# Patient Record
Sex: FEMALE | Race: WHITE | NOT HISPANIC OR LATINO | URBAN - METROPOLITAN AREA
[De-identification: names, ages, dates, MRNs, and addresses within clinical notes are randomized per-mention and may not be internally consistent; named-entity substitution may affect disease eponyms.]

---
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
## 2018-05-09 NOTE — PATIENT DISCUSSION
(H37.711) Puckering of macula, bilateral - Assesment : Examination revealed ERM OU. OCT today shows some DME OS - Plan : Monitor. Advised patient to call our office with decreased vision or increased symptoms. Keep scheduled appoint  with  Dr Virginia Esparza  in August.

## 2018-05-09 NOTE — PATIENT DISCUSSION
(S60.5974) Type 2 diab with mild nonp rtnop with macular edema l eye - Assesment : Examination reveals Type II diabetes with mild non proliferative changes. HX of Avastin injections and PRP lasers OU. MAC OCT OS - DME evident today.  - Plan : Continue regular follow ups and treatment with retina specialist.

## 2019-05-03 NOTE — PATIENT DISCUSSION
(D72.0047) Type 2 diab with mild nonp rtnop without mclr edema r eye - Assesment : Examination reveals Type II diabetes with mild non proliferative changes.   HX of Avastin injections and PRP lasers OU. - Plan : Monitor for changes Continue regular follow ups with retina specialist.

## 2019-05-03 NOTE — PATIENT DISCUSSION
(A28.8241) Type 2 diab with mild nonp rtnop with macular edema l eye - Assesment : Examination reveals Type II diabetes with mild non proliferative changes. HX of Avastin injections and PRP lasers OU.   MAC OCT OS - DME evident today with reduced edema. - Plan : Continue regular follow ups and treatment with retina specialist.

## 2019-05-03 NOTE — PATIENT DISCUSSION
(H35.373) Puckering of macula, bilateral - Assesment : Examination revealed ERM OU. - Plan : Monitor. Advised patient to call our office with decreased vision or increased symptoms.  Keep scheduled appoint  with  Dr Tonie Moe

## 2019-05-03 NOTE — PATIENT DISCUSSION
(H35.341) Macular cyst, hole, or pseudohole, right eye - Assesment : Examination revealed a lamellar macular hole OD - stable on OCT today.  - Plan : Recommend to monitor for changes and continue regular follow ups with retina specialist.  1 year Exam-[facility] [place]

## 2020-04-18 ASSESSMENT — TONOMETRY
OS_IOP_MMHG: 17.0
OD_IOP_MMHG: 22.0

## 2020-04-18 ASSESSMENT — KERATOMETRY
OD_K2POWER_DIOPTERS: 43.75
OS_AXISANGLE_DEGREES: 14
OD_K1POWER_DIOPTERS: 44
OS_K2POWER_DIOPTERS: 44
OS_AXISANGLE2_DEGREES: 104
OD_AXISANGLE_DEGREES: 140
OS_K1POWER_DIOPTERS: 44.25
OD_AXISANGLE2_DEGREES: 50

## 2020-04-18 ASSESSMENT — VISUAL ACUITY
OS_SC: J1+
OS_SC: 20/15
OD_SC: J1+
OD_SC: 20/15

## 2021-02-25 ENCOUNTER — APPOINTMENT (RX ONLY)
Dept: URBAN - METROPOLITAN AREA CLINIC 126 | Facility: CLINIC | Age: 21
Setting detail: DERMATOLOGY
End: 2021-02-25

## 2021-02-25 DIAGNOSIS — D18.0 HEMANGIOMA: ICD-10-CM

## 2021-02-25 DIAGNOSIS — L57.8 OTHER SKIN CHANGES DUE TO CHRONIC EXPOSURE TO NONIONIZING RADIATION: ICD-10-CM | Status: WORSENING

## 2021-02-25 DIAGNOSIS — L90.5 SCAR CONDITIONS AND FIBROSIS OF SKIN: ICD-10-CM

## 2021-02-25 DIAGNOSIS — Z71.89 OTHER SPECIFIED COUNSELING: ICD-10-CM

## 2021-02-25 DIAGNOSIS — I78.8 OTHER DISEASES OF CAPILLARIES: ICD-10-CM

## 2021-02-25 DIAGNOSIS — D22 MELANOCYTIC NEVI: ICD-10-CM

## 2021-02-25 PROBLEM — D18.01 HEMANGIOMA OF SKIN AND SUBCUTANEOUS TISSUE: Status: ACTIVE | Noted: 2021-02-25

## 2021-02-25 PROBLEM — D22.5 MELANOCYTIC NEVI OF TRUNK: Status: ACTIVE | Noted: 2021-02-25

## 2021-02-25 PROCEDURE — 99204 OFFICE O/P NEW MOD 45 MIN: CPT

## 2021-02-25 PROCEDURE — ? PRESCRIPTION MEDICATION MANAGEMENT

## 2021-02-25 PROCEDURE — ? RECOMMENDATIONS

## 2021-02-25 PROCEDURE — ? COUNSELING

## 2021-02-25 ASSESSMENT — LOCATION DETAILED DESCRIPTION DERM
LOCATION DETAILED: RIGHT PROXIMAL PRETIBIAL REGION
LOCATION DETAILED: LEFT MID-UPPER BACK
LOCATION DETAILED: EPIGASTRIC SKIN
LOCATION DETAILED: LEFT CLAVICULAR SKIN
LOCATION DETAILED: INFERIOR MID FOREHEAD
LOCATION DETAILED: LEFT CENTRAL MALAR CHEEK
LOCATION DETAILED: RIGHT INFERIOR CENTRAL MALAR CHEEK
LOCATION DETAILED: LEFT SUPERIOR MEDIAL UPPER BACK

## 2021-02-25 ASSESSMENT — LOCATION ZONE DERM
LOCATION ZONE: LEG
LOCATION ZONE: FACE
LOCATION ZONE: TRUNK

## 2021-02-25 ASSESSMENT — LOCATION SIMPLE DESCRIPTION DERM
LOCATION SIMPLE: INFERIOR FOREHEAD
LOCATION SIMPLE: LEFT CLAVICULAR SKIN
LOCATION SIMPLE: LEFT CHEEK
LOCATION SIMPLE: LEFT UPPER BACK
LOCATION SIMPLE: RIGHT PRETIBIAL REGION
LOCATION SIMPLE: ABDOMEN
LOCATION SIMPLE: RIGHT CHEEK

## 2021-02-25 NOTE — PROCEDURE: RECOMMENDATIONS
Recommendation Preamble: The following recommendations were made during the visit:
Detail Level: Zone
Recommendation Override: Facials hydra facial
Render Risk Assessment In Note?: no

## 2021-02-25 NOTE — PROCEDURE: PRESCRIPTION MEDICATION MANAGEMENT
Detail Level: Zone
Render In Strict Bullet Format?: No
Initiate Treatment: CE ferculic acid
Continue Regimen: Morning \\nNeutrogena moisturizer HA\\nNiacin \\nRCD ult pref SPF (gave samples, if pt likes it will rtc to purchase)\\n\\nEvening\\nCerave PM\\nLactic acid 1x a week\\nTretinoin 1 x a week\\nCE Ferculic acid
Modify Regimen: Increase tretinoin to more than 1x a week and will call if needs a refill

## 2022-05-23 NOTE — PATIENT DISCUSSION
(A04.4352) Type 2 diab with mild nonp rtnop without mclr edema r eye - Assesment : Examination reveals Type II diabetes with mild non proliferative changes. HX of Avastin injections and PRP lasers OU.   . - Plan : Continue regular follow ups with retina specialist. 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  He reports intermittent discomfort with one episode of constipation that had resolved. He had recently had to manually reduce the left hernia. As well he had noted to have another small bulged on the right which was reduced by spontaneously laying in the Recumbant position . Today he reports only minimal discomfort.    He present to PST in anticipation of having Bilateral inguinal hernia repair with mesh.  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  He reports intermittent discomfort with one episode of constipation that had resolved. He had recently had to manually reduce the left hernia. Pt followed up with DR. Gar and upon exam right inguinal hernia noted, pt denies any bulge or pain to right side  . Today he reports only minimal discomfort.    He present to PST in anticipation of having Bilateral inguinal hernia repair with mesh.

## 2022-06-04 ENCOUNTER — TELEPHONE ENCOUNTER (OUTPATIENT)
Dept: URBAN - METROPOLITAN AREA CLINIC 68 | Facility: CLINIC | Age: 22
End: 2022-06-04

## 2022-06-05 ENCOUNTER — TELEPHONE ENCOUNTER (OUTPATIENT)
Dept: URBAN - METROPOLITAN AREA CLINIC 68 | Facility: CLINIC | Age: 22
End: 2022-06-05

## 2022-06-05 RX ORDER — ROSUVASTATIN CALCIUM 10 MG/1
ROSUVASTATIN CALCIUM( 10MG ORAL 1 DAILY ) ACTIVE -HX ENTRY TABLET, FILM COATED ORAL DAILY
Status: ACTIVE | COMMUNITY
Start: 2019-01-11

## 2022-06-05 RX ORDER — NORETHINDRONE ACETATE AND ETHINYL ESTRADIOL, AND FERROUS FUMARATE 1MG-20(24)
TAYTULLA( 1-20MG-MCG(24) ORAL 1 DAILY ) ACTIVE -HX ENTRY KIT ORAL DAILY
Status: ACTIVE | COMMUNITY
Start: 2019-01-11

## 2022-06-25 ENCOUNTER — TELEPHONE ENCOUNTER (OUTPATIENT)
Age: 22
End: 2022-06-25

## 2022-06-25 RX ORDER — DICYCLOMINE HYDROCHLORIDE 20 MG/2ML
INJECTION, SOLUTION INTRAMUSCULAR
Qty: 120 | Refills: 0 | OUTPATIENT
Start: 2019-01-11 | End: 2019-03-12

## 2022-06-26 ENCOUNTER — TELEPHONE ENCOUNTER (OUTPATIENT)
Age: 22
End: 2022-06-26

## 2022-06-26 RX ORDER — NORETHINDRONE ACETATE AND ETHINYL ESTRADIOL, AND FERROUS FUMARATE 1MG-20(24)
TAYTULLA( 1-20MG-MCG(24) ORAL 1 DAILY ) ACTIVE -HX ENTRY KIT ORAL DAILY
Status: ACTIVE | COMMUNITY
Start: 2019-01-11

## 2022-06-26 RX ORDER — ROSUVASTATIN CALCIUM 10 MG/1
ROSUVASTATIN CALCIUM( 10MG ORAL 1 DAILY ) ACTIVE -HX ENTRY TABLET, FILM COATED ORAL DAILY
Status: ACTIVE | COMMUNITY
Start: 2019-01-11

## 2023-08-28 PROBLEM — Z00.00 ENCOUNTER FOR PREVENTIVE HEALTH EXAMINATION: Status: ACTIVE | Noted: 2023-08-28

## 2023-08-30 ENCOUNTER — APPOINTMENT (OUTPATIENT)
Dept: HEART AND VASCULAR | Facility: CLINIC | Age: 23
End: 2023-08-30

## 2023-08-31 ENCOUNTER — NON-APPOINTMENT (OUTPATIENT)
Age: 23
End: 2023-08-31

## 2023-09-01 ENCOUNTER — NON-APPOINTMENT (OUTPATIENT)
Age: 23
End: 2023-09-01

## 2023-09-01 ENCOUNTER — APPOINTMENT (OUTPATIENT)
Dept: HEART AND VASCULAR | Facility: CLINIC | Age: 23
End: 2023-09-01
Payer: COMMERCIAL

## 2023-09-01 VITALS
HEART RATE: 101 BPM | BODY MASS INDEX: 21.97 KG/M2 | OXYGEN SATURATION: 98 % | HEIGHT: 63 IN | DIASTOLIC BLOOD PRESSURE: 63 MMHG | WEIGHT: 124 LBS | TEMPERATURE: 98.1 F | SYSTOLIC BLOOD PRESSURE: 92 MMHG

## 2023-09-01 DIAGNOSIS — Z80.6 FAMILY HISTORY OF LEUKEMIA: ICD-10-CM

## 2023-09-01 DIAGNOSIS — Z78.9 OTHER SPECIFIED HEALTH STATUS: ICD-10-CM

## 2023-09-01 DIAGNOSIS — Z80.0 FAMILY HISTORY OF MALIGNANT NEOPLASM OF DIGESTIVE ORGANS: ICD-10-CM

## 2023-09-01 PROCEDURE — 93000 ELECTROCARDIOGRAM COMPLETE: CPT

## 2023-09-01 PROCEDURE — 99204 OFFICE O/P NEW MOD 45 MIN: CPT | Mod: 25

## 2023-09-01 RX ORDER — CALCIUM CARBONATE/VITAMIN D3 600 MG-10
TABLET ORAL
Refills: 0 | Status: DISCONTINUED | COMMUNITY

## 2023-09-01 NOTE — PHYSICAL EXAM
[Well Developed] : well developed [Well Nourished] : well nourished [No Acute Distress] : no acute distress [Normal Conjunctiva] : normal conjunctiva [Normal Venous Pressure] : normal venous pressure [Normal S1, S2] : normal S1, S2 [Clear Lung Fields] : clear lung fields [No Respiratory Distress] : no respiratory distress

## 2023-09-02 NOTE — HISTORY OF PRESENT ILLNESS
[FreeTextEntry1] : Marlon is 24 yo F w/ hx Hypercholesterolemia who presents for HLD consultation. She has seen multiple providers in the past. She brought recent results which will be scanned in. Her mother also has hypercholesterolemia. Her family is from [place]. Hypercholesterolemia was first noticed in age 14. She has been off and on statin therapy since that time. She stopped statin therapy in 2021, after developing long covid and reactivation of Thang barr.    Labs:  She had blood test recently. Last Thursday Tchol 270, LDL 180s, Dp142w, HDL 69.   Family history: Mother and Grandmother. No family hx of ascvd. Leukemia dad's mom. Aunt and grandfather mother side had liver cancer.  LIFESTYLE HISTORY:  She engages in moderate physical activity.  Diet: plant protein mostly, olive oil and  Meds: birth control, anxiety, omega-3 FA . She denied plans of becoming pregnant.   =============================== RADIOLOGY/IMAGING/DIAGNOSTIC TESTING:  -EKG (09/01/2023):  Sinus Rhythm, HR 74  wnl.   LABS:  -A1c (date):  -lipoprotein A (date):  -TSH (date):

## 2023-09-02 NOTE — ASSESSMENT
[FreeTextEntry1] : Marlon is 24 yo F w/ hx Hypercholesterolemia who presents for HLD consultation.  #HLD - patient already on low saturated fat diet and moderate physical activity. Likely a genetic component of HLD given family history (FH vs. polygenic). Given she has about 9 year since diagnosis of elevated lipid encouraged statin therapy to decrease overall risk of ASCVD across lifetime. Benefits and risk discussed. Patient aware to stop medication at least 3 months prior to conception. Will monitor liver enzymes closely given family history of liver disease however this was in the setting of alcohol use. Sent rosuvastatin 10mg daily to her pharmacy. Labs in 3 months. Telehealth follow appointment afterwards at which point crestor will likely be increased. Checking lipoprotien a to check for additional risk. Consider nutritionist simona henry or genetic testing depending on results, response to therapy or development of features of familial hypercholesterolemia.    WESLEY fuentes participated as a fellow in training. Case discussed with Dr. Zeng.

## 2023-12-28 ENCOUNTER — APPOINTMENT (OUTPATIENT)
Dept: HEART AND VASCULAR | Facility: CLINIC | Age: 23
End: 2023-12-28
Payer: COMMERCIAL

## 2023-12-28 PROCEDURE — 99215 OFFICE O/P EST HI 40 MIN: CPT | Mod: 95

## 2023-12-28 RX ORDER — LORAZEPAM 0.5 MG/1
0.5 TABLET ORAL
Refills: 0 | Status: ACTIVE | COMMUNITY

## 2023-12-28 NOTE — HISTORY OF PRESENT ILLNESS
[Home] : at home, [unfilled] , at the time of the visit. [Other Location: e.g. Home (Enter Location, City,State)___] : at [unfilled] [Verbal consent obtained from patient] : the patient, [unfilled] [FreeTextEntry1] : Marlon is 22 yo F w/ hx Hypercholesterolemia who presents for follow up.   She has been compliant w/ Crestor 10mg and tolerating well. While she would prefer to not have to take meds, she is very commited to being preventative and knows her family history is a strong indication as well as her young age for developing HLD.  She continues to eat very well; a predominantly plant-based diet, but wonders about things she could do to improve further; feels she could limit sugar more.   Patient denies any chest pain, SOB, palpitations, orthopnea, PND or LE edema. ======================================================================== She has seen multiple providers in the past. She brought recent results which will be scanned in. Her mother also has hypercholesterolemia. Her family is from [place]. Hypercholesterolemia was first noticed in age 14. She has been off and on statin therapy since that time. She stopped statin therapy in 2021, after developing long covid and reactivation of Thang barr.    Labs:  She had blood test recently. Last Thursday Tchol 270, LDL 180s, Ns780y, HDL 69.   Family history: Mother and Grandmother. No family hx of ascvd. Leukemia dad's mom. Aunt and grandfather mother side had liver cancer.  LIFESTYLE HISTORY:  She engages in moderate physical activity.  Diet: plant protein mostly, olive oil and  Meds: birth control, anxiety, omega-3 FA . She denied plans of becoming pregnant.   =============================== RADIOLOGY/IMAGING/DIAGNOSTIC TESTING:  -EKG (09/01/2023):  Sinus Rhythm, HR 74  wnl.   LABS:  -A1c (date):  -lipoprotein A (date):  -TSH (date):

## 2023-12-28 NOTE — DISCUSSION/SUMMARY
[FreeTextEntry1] : Marlon is 24 yo F w/ hx Hypercholesterolemia who presents for follow up.   #HLD - patient already on low saturated fat diet and moderate physical activity. Likely a genetic component of HLD given family history (FH vs. polygenic) although Lp(a) normal.  - LDL improved from a baseline of ~180 to 124 on repeat labs last month on Crestor 10mg qd - Patient aware to stop medication 2-3 months prior to conception.  - repeat CMP also showed normal liver enzymes; will continue to monitor liver enzymes closely given family history of liver disease however this was in the setting of alcohol use.  - advised increasing to Crestor 20mg to lower LDL to <100mg/dL, but pt defers for now, prefering to work on lifestyle and will consider if needed in the future  - will refer to nutritionist simona henry; advised she not take RYR as she is already on a statin, but increasing fiber and considering plant sterols is reasonable as she is very interested in what she could do to get to goal staying on Crestor 10mg  - advised can consider genetic testing in the future as pt is surprised Lp(a) is normal; advised she could have other genetic conditions/variations contributing   Will push f/u from March to later in the spring/early summer to give pt time for lifestyle mods and can repeat lipids to reassess.

## 2024-01-17 ENCOUNTER — APPOINTMENT (OUTPATIENT)
Dept: CARDIOLOGY | Facility: CLINIC | Age: 24
End: 2024-01-17
Payer: COMMERCIAL

## 2024-01-17 DIAGNOSIS — E78.00 PURE HYPERCHOLESTEROLEMIA, UNSPECIFIED: ICD-10-CM

## 2024-01-17 PROCEDURE — 97802 MEDICAL NUTRITION INDIV IN: CPT

## 2024-02-28 ENCOUNTER — RX RENEWAL (OUTPATIENT)
Age: 24
End: 2024-02-28

## 2024-02-28 RX ORDER — ROSUVASTATIN CALCIUM 10 MG/1
10 TABLET, FILM COATED ORAL
Qty: 90 | Refills: 1 | Status: ACTIVE | COMMUNITY
Start: 2023-09-01 | End: 1900-01-01

## 2024-08-05 ENCOUNTER — APPOINTMENT (OUTPATIENT)
Dept: HEART AND VASCULAR | Facility: CLINIC | Age: 24
End: 2024-08-05

## 2024-08-05 ENCOUNTER — NON-APPOINTMENT (OUTPATIENT)
Age: 24
End: 2024-08-05

## 2024-08-05 PROCEDURE — G2211 COMPLEX E/M VISIT ADD ON: CPT | Mod: NC

## 2024-08-05 PROCEDURE — 99214 OFFICE O/P EST MOD 30 MIN: CPT

## 2024-08-05 PROCEDURE — 93000 ELECTROCARDIOGRAM COMPLETE: CPT

## 2024-08-05 NOTE — HISTORY OF PRESENT ILLNESS
[FreeTextEntry1] : Marlon is 24 yo F w/ hx Hypercholesterolemia who presents for follow up.   She has been compliant w/ Crestor 10mg and tolerating well. She continues to eat very well; a predominantly plant-based diet, but wonders about things she could do to improve further; feels she could limit sugar more.  Patient denies any chest pain, SOB, palpitations, orthopnea, PND or LE edema. ======================================================================== She has seen multiple providers in the past. She brought recent results which will be scanned in. Her mother also has hypercholesterolemia. Her family is from [place]. Hypercholesterolemia was first noticed in age 14. She has been off and on statin therapy since that time. She stopped statin therapy in 2021, after developing long covid and reactivation of Thang wallace.    Labs:  8/1/24- chol 188, HDL 65, trig 119,  She had blood test recently.   Tchol  270, LDL 180s, Os382g, HDL 69.  11/29/23- chol 210, HDL 61, , trig 134, Lpa 17  Family history: Mother and Grandmother. No family hx of ascvd. Leukemia dad's mom. Aunt and grandfather mother side had liver cancer.  LIFESTYLE HISTORY:  She engages in moderate physical activity.  Diet: plant protein mostly, olive oil and  Meds: birth control, anxiety, omega-3 FA . She denied plans of becoming pregnant.   =============================== RADIOLOGY/IMAGING/DIAGNOSTIC TESTING:  -EKG (09/01/2023):  Sinus Rhythm, HR 74  wnl.

## 2024-08-05 NOTE — DISCUSSION/SUMMARY
[FreeTextEntry1] : Anatasia is 23 yo F w/ hx Hypercholesterolemia who presents for follow up.   #HLD - Likely a genetic component of HLD given family history (FH vs. polygenic) Lp(a) normal.  - LDL improved from a baseline of ~180 to 101 on repeat labs last month on Crestor 10mg qd - Patient aware to stop medication 2-3 months prior to conception.  - she will continue current dose and work on lifestyle. - She will return in one year which she prefers with her busy schedule  [EKG obtained to assist in diagnosis and management of assessed problem(s)] : EKG obtained to assist in diagnosis and management of assessed problem(s)

## 2024-08-05 NOTE — DISCUSSION/SUMMARY
[FreeTextEntry1] : Anatasia is 25 yo F w/ hx Hypercholesterolemia who presents for follow up.   #HLD - Likely a genetic component of HLD given family history (FH vs. polygenic) Lp(a) normal.  - LDL improved from a baseline of ~180 to 101 on repeat labs last month on Crestor 10mg qd - Patient aware to stop medication 2-3 months prior to conception.  - she will continue current dose and work on lifestyle. - She will return in one year which she prefers with her busy schedule  [EKG obtained to assist in diagnosis and management of assessed problem(s)] : EKG obtained to assist in diagnosis and management of assessed problem(s)

## 2024-08-05 NOTE — HISTORY OF PRESENT ILLNESS
[FreeTextEntry1] : Marlon is 22 yo F w/ hx Hypercholesterolemia who presents for follow up.   She has been compliant w/ Crestor 10mg and tolerating well. She continues to eat very well; a predominantly plant-based diet, but wonders about things she could do to improve further; feels she could limit sugar more.  Patient denies any chest pain, SOB, palpitations, orthopnea, PND or LE edema. ======================================================================== She has seen multiple providers in the past. She brought recent results which will be scanned in. Her mother also has hypercholesterolemia. Her family is from [place]. Hypercholesterolemia was first noticed in age 14. She has been off and on statin therapy since that time. She stopped statin therapy in 2021, after developing long covid and reactivation of Thang wallace.    Labs:  8/1/24- chol 188, HDL 65, trig 119,  She had blood test recently.   Tchol  270, LDL 180s, Os120s, HDL 69.  11/29/23- chol 210, HDL 61, , trig 134, Lpa 17  Family history: Mother and Grandmother. No family hx of ascvd. Leukemia dad's mom. Aunt and grandfather mother side had liver cancer.  LIFESTYLE HISTORY:  She engages in moderate physical activity.  Diet: plant protein mostly, olive oil and  Meds: birth control, anxiety, omega-3 FA . She denied plans of becoming pregnant.   =============================== RADIOLOGY/IMAGING/DIAGNOSTIC TESTING:  -EKG (09/01/2023):  Sinus Rhythm, HR 74  wnl.